# Patient Record
Sex: MALE | Race: WHITE | NOT HISPANIC OR LATINO | Employment: FULL TIME | URBAN - METROPOLITAN AREA
[De-identification: names, ages, dates, MRNs, and addresses within clinical notes are randomized per-mention and may not be internally consistent; named-entity substitution may affect disease eponyms.]

---

## 2017-09-28 ENCOUNTER — OFFICE VISIT (OUTPATIENT)
Dept: MEDICAL GROUP | Facility: MEDICAL CENTER | Age: 27
End: 2017-09-28
Payer: COMMERCIAL

## 2017-09-28 VITALS
OXYGEN SATURATION: 97 % | SYSTOLIC BLOOD PRESSURE: 112 MMHG | WEIGHT: 217 LBS | TEMPERATURE: 98.8 F | BODY MASS INDEX: 26.98 KG/M2 | RESPIRATION RATE: 16 BRPM | HEART RATE: 74 BPM | DIASTOLIC BLOOD PRESSURE: 78 MMHG | HEIGHT: 75 IN

## 2017-09-28 DIAGNOSIS — Z23 NEED FOR INFLUENZA VACCINATION: ICD-10-CM

## 2017-09-28 DIAGNOSIS — L30.9 ECZEMA, UNSPECIFIED TYPE: ICD-10-CM

## 2017-09-28 DIAGNOSIS — Z00.00 ANNUAL PHYSICAL EXAM: ICD-10-CM

## 2017-09-28 PROCEDURE — 90471 IMMUNIZATION ADMIN: CPT | Performed by: PHYSICIAN ASSISTANT

## 2017-09-28 PROCEDURE — 99395 PREV VISIT EST AGE 18-39: CPT | Mod: 25 | Performed by: PHYSICIAN ASSISTANT

## 2017-09-28 PROCEDURE — 90686 IIV4 VACC NO PRSV 0.5 ML IM: CPT | Performed by: PHYSICIAN ASSISTANT

## 2017-09-28 RX ORDER — TRIAMCINOLONE ACETONIDE 1 MG/G
1 CREAM TOPICAL 3 TIMES DAILY
Qty: 60 TUBE | Refills: 5 | Status: SHIPPED | OUTPATIENT
Start: 2017-09-28

## 2017-09-28 NOTE — PROGRESS NOTES
"Subjective:   Leon Swanson is a 27 y.o. male here today for annual physical.    Annual physical exam  This is a 27-year-old male who is here today for a physical. Form is required for work. He is doing well today. Has lost almost 20 pounds over the past few months. His eczema is flaring on his hands. He uses Eucerin on a regular basis. States since he went to a celiac diet his eczema has improved. His parents are still alive and well. No changes in his health.       Current medicines (including changes today)  Current Outpatient Prescriptions   Medication Sig Dispense Refill   • triamcinolone acetonide (KENALOG) 0.1 % Cream Apply 1 Application to affected area(s) 3 times a day. 60 Tube 5     No current facility-administered medications for this visit.      He  has no past medical history of Diabetes (CMS-[facility]) or Hypertension.    ROS   No chest pain, no shortness of breath, no abdominal pain and all other systems were reviewed and are negative.       Objective:     Blood pressure 112/78, pulse 74, temperature 37.1 °C (98.8 °F), resp. rate 16, height 1.905 m (6' 3\"), weight 98.4 kg (217 lb), SpO2 97 %. Body mass index is 27.12 kg/m².   Physical Exam:  Constitutional: Alert, no distress.  Skin: Warm, dry, good turgor, no rashes in visible areas.  Eye: Equal, round and reactive, conjunctiva clear, lids normal.  ENMT: Lips without lesions, good dentition, oropharynx clear.  Neck: Trachea midline, no masses.   Lymph: No cervical or supraclavicular lymphadenopathy  Respiratory: Unlabored respiratory effort, lungs clear to auscultation, no wheezes, no ronchi.  Cardiovascular: Normal S1, S2, no murmur, no edema.  Abdomen: Soft, non-tender, no masses.  Psych: Alert and oriented x3, normal affect and mood.        Assessment and Plan:   The following treatment plan was discussed    1. Annual physical exam  Healthy male. Order lipid profile as a baseline. Continue exercise and eat healthy.  - LIPID PANEL    2. Eczema, unspecified " type  Chronic condition. Provided prescription for Kenalog. May continue Eucerin use.  - triamcinolone acetonide (KENALOG) 0.1 % Cream; Apply 1 Application to affected area(s) 3 times a day.  Dispense: 60 Tube; Refill: 5    3. Need for influenza vaccination  Administered left deltoid without complaints. Discussed side effects.  - INFLUENZA VACCINE QUAD INJ >3Y(PF)      Followup: Return if symptoms worsen or fail to improve.    Please note that this dictation was created using voice recognition software. I have made every reasonable attempt to correct obvious errors, but I expect that there are errors of grammar and possibly content that I did not discover before finalizing the note.

## 2017-09-28 NOTE — ASSESSMENT & PLAN NOTE
This is a 27-year-old male who is here today for a physical. Form is required for work. He is doing well today. Has lost almost 20 pounds over the past few months. His eczema is flaring on his hands. He uses Eucerin on a regular basis. States since he went to a celiac diet his eczema has improved. His parents are still alive and well. No changes in his health.

## 2017-12-11 ENCOUNTER — HOSPITAL ENCOUNTER (OUTPATIENT)
Dept: LAB | Facility: MEDICAL CENTER | Age: 27
End: 2017-12-11
Attending: PHYSICIAN ASSISTANT
Payer: COMMERCIAL

## 2017-12-11 LAB
CHOLEST SERPL-MCNC: 176 MG/DL (ref 100–199)
HDLC SERPL-MCNC: 47 MG/DL
LDLC SERPL CALC-MCNC: 120 MG/DL
TRIGL SERPL-MCNC: 45 MG/DL (ref 0–149)

## 2017-12-11 PROCEDURE — 80061 LIPID PANEL: CPT

## 2017-12-11 PROCEDURE — 36415 COLL VENOUS BLD VENIPUNCTURE: CPT

## 2017-12-12 ENCOUNTER — TELEPHONE (OUTPATIENT)
Dept: MEDICAL GROUP | Facility: MEDICAL CENTER | Age: 27
End: 2017-12-12

## 2017-12-12 NOTE — TELEPHONE ENCOUNTER
----- Message from Ray Hein P.A.-C. sent at 12/12/2017  7:09 AM PST -----  Please contact Leon.  Bad cholesterol is elevated at 120; should be below 100.  Otherwise total cholesterol is at 176; should be below 200.  Continue to exercise routinely and eat healthy.  Repeat in one year.  Thank you.    Ray

## 2017-12-12 NOTE — TELEPHONE ENCOUNTER
Phone Number Called: [phone] (home)       Message: Left msg for patient to call back.    Left Message for patient to call back: yes

## 2017-12-15 ENCOUNTER — OFFICE VISIT (OUTPATIENT)
Dept: MEDICAL GROUP | Facility: MEDICAL CENTER | Age: 27
End: 2017-12-15
Payer: COMMERCIAL

## 2017-12-15 DIAGNOSIS — S80.00XA: ICD-10-CM

## 2017-12-15 DIAGNOSIS — Z48.02 VISIT FOR SUTURE REMOVAL: ICD-10-CM

## 2017-12-15 PROCEDURE — 99212 OFFICE O/P EST SF 10 MIN: CPT | Performed by: PHYSICIAN ASSISTANT

## 2017-12-15 ASSESSMENT — PATIENT HEALTH QUESTIONNAIRE - PHQ9: CLINICAL INTERPRETATION OF PHQ2 SCORE: 0

## 2017-12-15 NOTE — PROGRESS NOTES
Subjective:   Leon Swanson is a 27 y.o. male here today for suture removal left knee 2 weeks.    Traumatic ecchymosis of knee, initial encounter  This is a 27-year-old male who was at [facility] when he was hit by a ski. He states he was kneeling and watching his girlfriend ski down the run when a 7-year-old lost control on his skis and came towards him. He misjudged and the ski hit his left knee. He had 5 sutures above the knee. Suturing was done in house. Incident occurred about 2 weeks ago. Since then he's been keeping the knee dry. He applies Saran wrap or duct tape while he is bathing. Denies any significant pain of the knee. Is looking forward to getting back out on the runs.       Current medicines (including changes today)  Current Outpatient Prescriptions   Medication Sig Dispense Refill   • triamcinolone acetonide (KENALOG) 0.1 % Cream Apply 1 Application to affected area(s) 3 times a day. 60 Tube 5     No current facility-administered medications for this visit.      He  has no past medical history of Diabetes (CMS-[facility]) or Hypertension.    ROS   No chest pain, no shortness of breath, no abdominal pain and all other systems were reviewed and are negative.       Objective:     There were no vitals taken for this visit. There is no height or weight on file to calculate BMI.   Physical Exam:  Constitutional: Alert, no distress.  Skin:5 sutures which were all removed. Wound appears clean. No discharge noted. Significant ecchymosis noted around the scar. Yellowish in appearing.  Eye: Equal, round and reactive, conjunctiva clear, lids normal.  ENMT: Lips without lesions, good dentition, oropharynx clear.  Neck: Trachea midline, no masses.   Lymph: No cervical or supraclavicular lymphadenopathy  Respiratory: Unlabored respiratory effort, lungs appear clear, no wheezes.  Cardiovascular: Regular rate and rhythm, no edema.  Psych: Alert and oriented x3, normal affect and mood.        Assessment and  Plan:   The following treatment plan was discussed    1. Traumatic ecchymosis of knee, initial encounter  Status post skiing injury. Advised he may returns to snowboard in one week. May wash knee as needed. Appears to be healing well.    2. Visit for suture removal  5 sutures removed successfully. Follow up as needed.      Followup: No Follow-up on file.    Please note that this dictation was created using voice recognition software. I have made every reasonable attempt to correct obvious errors, but I expect that there are errors of grammar and possibly content that I did not discover before finalizing the note.

## 2017-12-15 NOTE — ASSESSMENT & PLAN NOTE
This is a 27-year-old male who was at [facility] [employer] [facility] when he was hit by a ski. He states he was kneeling and watching his girlfriend ski down the run when a 7-year-old lost control on his skis and came towards him. He misjudged and the ski hit his left knee. He had 5 sutures above the knee. Suturing was done in house. Incident occurred about 2 weeks ago. Since then he's been keeping the knee dry. He applies Saran wrap or duct tape while he is bathing. Denies any significant pain of the knee. Is looking forward to getting back out on the runs.

## 2017-12-18 NOTE — TELEPHONE ENCOUNTER
Phone Number Called: [phone] (home)       Message: Left msg for patient to call back.     Left Message for patient to call back: yes

## 2017-12-19 NOTE — TELEPHONE ENCOUNTER
Phone Number Called: [phone] (home)     Message: Notified patient of results and he stated understanding.    Left Message for patient to call back: no